# Patient Record
Sex: MALE | NOT HISPANIC OR LATINO
[De-identification: names, ages, dates, MRNs, and addresses within clinical notes are randomized per-mention and may not be internally consistent; named-entity substitution may affect disease eponyms.]

---

## 2021-10-26 ENCOUNTER — APPOINTMENT (OUTPATIENT)
Dept: HUMAN REPRODUCTION | Facility: CLINIC | Age: 36
End: 2021-10-26

## 2021-11-11 ENCOUNTER — NON-APPOINTMENT (OUTPATIENT)
Age: 36
End: 2021-11-11

## 2021-11-11 ENCOUNTER — APPOINTMENT (OUTPATIENT)
Dept: UROLOGY | Facility: CLINIC | Age: 36
End: 2021-11-11
Payer: COMMERCIAL

## 2021-11-11 VITALS
TEMPERATURE: 98.2 F | SYSTOLIC BLOOD PRESSURE: 116 MMHG | DIASTOLIC BLOOD PRESSURE: 85 MMHG | BODY MASS INDEX: 31.55 KG/M2 | HEART RATE: 74 BPM | HEIGHT: 69 IN | WEIGHT: 213 LBS

## 2021-11-11 DIAGNOSIS — E29.1 TESTICULAR HYPOFUNCTION: ICD-10-CM

## 2021-11-11 PROCEDURE — 99204 OFFICE O/P NEW MOD 45 MIN: CPT

## 2021-11-11 NOTE — ASSESSMENT
[FreeTextEntry1] : hypogonadism\par teratospermia\par no clear reversible male factor noted\par ?related to gastric bypass\par TT FSH E2 LH today\par weight loss\par supplements \par exercise\par consider ART\par f/u PRN

## 2021-11-11 NOTE — HISTORY OF PRESENT ILLNESS
[FreeTextEntry1] : 36M  to Tamika Rangel (36F) presents with failure to conceive x 18 months. previous  together. 1 daughter together turning 6 following IUI. \par negative female factor evaluation\par SA: 2021\par 2.2 ml/39 mil/ml/55% motility/0% morphlogy\par 1.8 ml/43/49%/0 morph\par gastric bypass x 2 (last 15 years ago)\par no ED

## 2021-11-11 NOTE — LETTER BODY
[Dear  ___] : Dear  [unfilled], [FreeTextEntry2] : Nay Lux MD\par [facility]\par [address], [address]\par [place] [address] [FreeTextEntry1] : I had the pleasure of seeing SARI RICE, a 36 year old man,  to your patient Tamika Rice, in the office in consultation today. Please see the attached note for full details.\par \par Thank you very much for allowing me to participate in the care of this patient. If you have any questions please feel free to call me at any time. \par \par \par Sincerely yours,\par \par \par \par Timothy Jordan MD, LC\par Director, Male Fertility and Microsurgery\par  of Urology\par [facility]\par

## 2021-11-11 NOTE — PHYSICAL EXAM
[Urethral Meatus] : meatus normal [Penis Abnormality] : normal uncircumcised penis [Urinary Bladder Findings] : the bladder was normal on palpation [Scrotum] : the scrotum was normal [Testes Tenderness] : no tenderness of the testes [Testes Mass (___cm)] : there were no testicular masses [FreeTextEntry1] : generous suprapubc fat pad. 20cc firm testes. no varicocle

## 2021-11-15 LAB
ESTRADIOL SERPL-MCNC: 31 PG/ML
FSH SERPL-MCNC: 2.9 IU/L
LH SERPL-ACNC: 5.7 IU/L
TESTOST BND SERPL-MCNC: 28.7 PG/ML
TESTOSTERONE TOTAL S: 723 NG/DL